# Patient Record
Sex: FEMALE | Race: BLACK OR AFRICAN AMERICAN | Employment: FULL TIME | URBAN - METROPOLITAN AREA
[De-identification: names, ages, dates, MRNs, and addresses within clinical notes are randomized per-mention and may not be internally consistent; named-entity substitution may affect disease eponyms.]

---

## 2021-03-22 ENCOUNTER — HOSPITAL ENCOUNTER (EMERGENCY)
Age: 26
Discharge: HOME | End: 2021-03-22
Attending: EMERGENCY MEDICINE | Admitting: EMERGENCY MEDICINE
Payer: COMMERCIAL

## 2021-03-22 VITALS
OXYGEN SATURATION: 98 % | BODY MASS INDEX: 44.53 KG/M2 | WEIGHT: 251.32 LBS | HEIGHT: 63 IN | DIASTOLIC BLOOD PRESSURE: 85 MMHG | RESPIRATION RATE: 16 BRPM | HEART RATE: 56 BPM | SYSTOLIC BLOOD PRESSURE: 137 MMHG | TEMPERATURE: 97.9 F

## 2021-03-22 DIAGNOSIS — S06.0X0A CONCUSSION WITHOUT LOSS OF CONSCIOUSNESS, INITIAL ENCOUNTER: Primary | ICD-10-CM

## 2021-03-22 LAB — HCG UR QL: NEGATIVE

## 2021-03-22 PROCEDURE — 99283 EMERGENCY DEPT VISIT LOW MDM: CPT

## 2021-03-22 PROCEDURE — 81025 URINE PREGNANCY TEST: CPT

## 2021-03-22 RX ORDER — ONDANSETRON 4 MG/1
4 TABLET, ORALLY DISINTEGRATING ORAL
Qty: 20 TAB | Refills: 0 | Status: SHIPPED | OUTPATIENT
Start: 2021-03-22

## 2021-03-22 RX ORDER — NAPROXEN 500 MG/1
500 TABLET ORAL 2 TIMES DAILY WITH MEALS
Qty: 30 TAB | Refills: 0 | Status: SHIPPED | OUTPATIENT
Start: 2021-03-22

## 2021-03-22 NOTE — LETTER
Solitario Orosco was seen and treated in our emergency department on 3/22/2021. She may return to work on 3/27/21. If you have any questions or concerns, please don't hesitate to call.  
 
 
Tobi Lou MD
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
## 2023-05-14 RX ORDER — NAPROXEN 500 MG/1
500 TABLET ORAL 2 TIMES DAILY WITH MEALS
COMMUNITY
Start: 2021-03-22

## 2023-05-14 RX ORDER — ONDANSETRON 4 MG/1
4 TABLET, ORALLY DISINTEGRATING ORAL EVERY 8 HOURS PRN
COMMUNITY
Start: 2021-03-22